# Patient Record
Sex: FEMALE | Race: WHITE | NOT HISPANIC OR LATINO | URBAN - METROPOLITAN AREA
[De-identification: names, ages, dates, MRNs, and addresses within clinical notes are randomized per-mention and may not be internally consistent; named-entity substitution may affect disease eponyms.]

---

## 2021-07-15 ENCOUNTER — OFFICE (OUTPATIENT)
Dept: URBAN - METROPOLITAN AREA CLINIC 5 | Facility: CLINIC | Age: 66
End: 2021-07-15

## 2021-07-15 VITALS
HEIGHT: 64 IN | DIASTOLIC BLOOD PRESSURE: 70 MMHG | HEART RATE: 80 BPM | SYSTOLIC BLOOD PRESSURE: 130 MMHG | OXYGEN SATURATION: 97 % | WEIGHT: 188 LBS

## 2021-07-15 DIAGNOSIS — Z80.0 FAMILY HISTORY OF MALIGNANT NEOPLASM OF DIGESTIVE ORGANS: ICD-10-CM

## 2021-07-15 DIAGNOSIS — B96.81 HELICOBACTER PYLORI [H. PYLORI] AS THE CAUSE OF DISEASES CLA: ICD-10-CM

## 2021-07-15 DIAGNOSIS — K57.30 DIVERTICULOSIS OF LARGE INTESTINE WITHOUT PERFORATION OR ABS: ICD-10-CM

## 2021-07-15 DIAGNOSIS — R74.8 ABNORMAL LEVELS OF OTHER SERUM ENZYMES: ICD-10-CM

## 2021-07-15 DIAGNOSIS — R12 HEARTBURN: ICD-10-CM

## 2021-07-15 PROCEDURE — 99204 OFFICE O/P NEW MOD 45 MIN: CPT | Performed by: INTERNAL MEDICINE

## 2021-10-21 ENCOUNTER — OFFICE (OUTPATIENT)
Dept: URBAN - METROPOLITAN AREA CLINIC 94 | Facility: CLINIC | Age: 66
End: 2021-10-21

## 2021-10-21 VITALS
SYSTOLIC BLOOD PRESSURE: 120 MMHG | HEART RATE: 74 BPM | WEIGHT: 193 LBS | DIASTOLIC BLOOD PRESSURE: 68 MMHG | OXYGEN SATURATION: 97 % | HEIGHT: 64 IN

## 2021-10-21 DIAGNOSIS — R74.8 ABNORMAL LEVELS OF OTHER SERUM ENZYMES: ICD-10-CM

## 2021-10-21 PROCEDURE — 99214 OFFICE O/P EST MOD 30 MIN: CPT | Performed by: INTERNAL MEDICINE

## 2021-10-21 NOTE — SERVICEHPINOTES
7/15/2021 I did see CARLEEN Finney female 1955 in our [facility] today for a consultation. Thank you for the kind referral. she is sent in because of an elevation of her alkaline phosphatase. This is an isolated elevation. She has absolutely no GI symptoms whatsoever. She tells me that her prior primary care physician, Dr. Mahmood, had been following her alkaline phosphatase but I only have 1 value from 1/2021brno known liver disease no ETOH (-) FH br1/2021 = alk phos 173 with normal fractions, then 188 then,,,brJuly 2021 AST 33 ALT 56 alk phos 341 brAlk phos 338 almost all hepatic fractionbrGGT 224 br5'-nucleotidase 28 [&lt15]brANA and AMA negative brCRP 4.5 ESR 56bad heartburn on Nexium cannot miss a day EGD 25 yrs ago says she had a stomach infectionbrCN 2019 report reviewed brEPIC reviewed extensively
br[=======================================================================]
br
  10/21/2021   I did see   CARLEEN Finney  1955   in our [facility] today for a follow-up visit. Comes in to discuss her liver biopsy results which were really rather nonspecific and reassuring. She is doing well without any clinical change from last time she still takes her PPI daily oral she has severe reflux and heartburn. She did not get her endoscopy done yet because there was an issue with having a cardiac echo because of some palpitations and some dyspnea therefore we postponed the EGD until that was performed. Results as below. She has good bowel function no complaints of any blood in the stool or constipation. She has not had any lab work done since I saw her in July   br